# Patient Record
Sex: FEMALE | Race: WHITE | Employment: FULL TIME | URBAN - METROPOLITAN AREA
[De-identification: names, ages, dates, MRNs, and addresses within clinical notes are randomized per-mention and may not be internally consistent; named-entity substitution may affect disease eponyms.]

---

## 2017-10-18 LAB — PAP-ABSTRACT: NORMAL

## 2017-11-30 ENCOUNTER — TRANSFERRED RECORDS (OUTPATIENT)
Dept: HEALTH INFORMATION MANAGEMENT | Facility: CLINIC | Age: 62
End: 2017-11-30

## 2019-03-04 ENCOUNTER — OFFICE VISIT (OUTPATIENT)
Dept: FAMILY MEDICINE | Facility: CLINIC | Age: 64
End: 2019-03-04
Payer: COMMERCIAL

## 2019-03-04 ENCOUNTER — TELEPHONE (OUTPATIENT)
Dept: FAMILY MEDICINE | Facility: CLINIC | Age: 64
End: 2019-03-04

## 2019-03-04 VITALS
HEIGHT: 65 IN | DIASTOLIC BLOOD PRESSURE: 91 MMHG | OXYGEN SATURATION: 97 % | HEART RATE: 73 BPM | SYSTOLIC BLOOD PRESSURE: 160 MMHG | WEIGHT: 187.9 LBS | TEMPERATURE: 97.2 F | BODY MASS INDEX: 31.31 KG/M2

## 2019-03-04 DIAGNOSIS — H61.21 IMPACTED CERUMEN OF RIGHT EAR: ICD-10-CM

## 2019-03-04 DIAGNOSIS — I10 BENIGN ESSENTIAL HYPERTENSION: ICD-10-CM

## 2019-03-04 DIAGNOSIS — Z00.00 ROUTINE HISTORY AND PHYSICAL EXAMINATION OF ADULT: Primary | ICD-10-CM

## 2019-03-04 DIAGNOSIS — E78.5 HYPERLIPIDEMIA LDL GOAL <100: ICD-10-CM

## 2019-03-04 PROCEDURE — 99386 PREV VISIT NEW AGE 40-64: CPT | Mod: 25 | Performed by: INTERNAL MEDICINE

## 2019-03-04 PROCEDURE — 69209 REMOVE IMPACTED EAR WAX UNI: CPT | Performed by: INTERNAL MEDICINE

## 2019-03-04 RX ORDER — SIMVASTATIN 5 MG
5 TABLET ORAL AT BEDTIME
COMMUNITY
End: 2019-03-04

## 2019-03-04 RX ORDER — SIMVASTATIN 5 MG
5 TABLET ORAL AT BEDTIME
Qty: 90 TABLET | Refills: 3 | Status: SHIPPED | OUTPATIENT
Start: 2019-03-04 | End: 2020-04-29

## 2019-03-04 RX ORDER — LOSARTAN POTASSIUM 50 MG/1
50 TABLET ORAL DAILY
Qty: 90 TABLET | Refills: 3 | Status: SHIPPED | OUTPATIENT
Start: 2019-03-04 | End: 2020-04-29

## 2019-03-04 RX ORDER — LOSARTAN POTASSIUM 50 MG/1
50 TABLET ORAL DAILY
COMMUNITY
End: 2019-03-04

## 2019-03-04 SDOH — HEALTH STABILITY: MENTAL HEALTH: HOW OFTEN DO YOU HAVE A DRINK CONTAINING ALCOHOL?: 2-4 TIMES A MONTH

## 2019-03-04 ASSESSMENT — MIFFLIN-ST. JEOR: SCORE: 1408.19

## 2019-03-04 NOTE — NURSING NOTE
Patient identified using two patient identifiers.  Ear exam showing wax occlusion completed by provider.  Solution: warm water was placed in the right ear(s) via irrigation tool: elephant ear.  Brenna Reaves CMA on 3/4/2019 at 3:09 PM

## 2019-03-04 NOTE — PROGRESS NOTES
SUBJECTIVE:   CC: Lety Reyes is an 64 year old woman who presents for preventive health visit.     Healthy Habits:    Do you get at least three servings of calcium containing foods daily (dairy, green leafy vegetables, etc.)? no, taking calcium and/or vitamin D supplement:     Amount of exercise or daily activities, outside of work: 3 day(s) per week    Problems taking medications regularly No    Medication side effects: No    Have you had an eye exam in the past two years? yes    Do you see a dentist twice per year? yes    Do you have sleep apnea, excessive snoring or daytime drowsiness?no        Today's PHQ-2 Score:   PHQ-2 ( 1999 [employer]) 3/4/2019   Q1: Little interest or pleasure in doing things 0   Q2: Feeling down, depressed or hopeless 0   PHQ-2 Score 0       Abuse: Current or Past(Physical, Sexual or Emotional)- No  Do you feel safe in your environment? Yes    Social History     Tobacco Use     Smoking status: Former Smoker     Smokeless tobacco: Never Used   Substance Use Topics     Alcohol use: Yes     Alcohol/week: 2.4 oz     Types: 4 Glasses of wine per week     Frequency: 2-4 times a month     If you drink alcohol do you typically have >3 drinks per day or >7 drinks per week? No                     Reviewed orders with patient.  Reviewed health maintenance and updated orders accordingly - Yes  Labs reviewed in EPIC        Pertinent mammograms are reviewed under the imaging tab.       Reviewed and updated as needed this visit by clinical staff  Tobacco  Allergies  Meds  Soc Hx        Reviewed and updated as needed this visit by Provider        Past Medical History:   Diagnosis Date     HTN (hypertension)      Hyperlipidemia         ROS:  CONSTITUTIONAL: NEGATIVE for fever, chills, change in weight  INTEGUMENTARY/SKIN: NEGATIVE for worrisome rashes, moles or lesions  EYES: NEGATIVE for vision changes or irritation  ENT: NEGATIVE for ear, mouth and throat problems  RESP: NEGATIVE for significant  "cough or SOB  BREAST: NEGATIVE for masses, tenderness or discharge  CV: NEGATIVE for chest pain, palpitations or peripheral edema  GI: NEGATIVE for nausea, abdominal pain, heartburn, or change in bowel habits  : NEGATIVE for unusual urinary or vaginal symptoms. No vaginal bleeding.  MUSCULOSKELETAL: NEGATIVE for significant arthralgias or myalgia  NEURO: NEGATIVE for weakness, dizziness or paresthesias  PSYCHIATRIC: NEGATIVE for changes in mood or affect     OBJECTIVE:   BP (!) 160/91 (BP Location: Left arm, Cuff Size: Adult Regular)   Pulse 73   Temp 97.2  F (36.2  C) (Oral)   Ht 1.651 m (5' 5\")   Wt 85.2 kg (187 lb 14.4 oz)   SpO2 97%   BMI 31.27 kg/m    EXAM:  GENERAL APPEARANCE: alert and no distress  EYES: Eyes grossly normal to inspection, PERRL and conjunctivae and sclerae normal  HENT: nose and mouth without ulcers or lesions, oropharynx clear and oral mucous membranes moist, cerumen impaction symptoms present in the right ear  NECK: no adenopathy, no asymmetry, masses, or scars and thyroid normal to palpation  RESP: lungs clear to auscultation - no rales, rhonchi or wheezes  BREAST: normal without masses, tenderness or nipple discharge and no palpable axillary masses or adenopathy  CV: regular rate and rhythm, normal S1 S2, no S3 or S4, no murmur, click or rub, no peripheral edema and peripheral pulses strong  ABDOMEN: soft, nontender, no hepatosplenomegaly, no masses and bowel sounds normal  MS: no musculoskeletal defects are noted and gait is age appropriate without ataxia  SKIN: no suspicious lesions or rashes  NEURO: Normal strength and tone, sensory exam grossly normal, mentation intact and speech normal  PSYCH: mentation appears normal and affect normal/bright    Diagnostic Test Results:  Results for orders placed or performed during the hospital encounter of 04/16/14   MA Diagnostic Digital Right    Narrative    DIAGNOSTIC MAMMOGRAM RIGHT, DIGITAL w/CAD, ULTRASOUND RIGHT BREAST,  4/16/2014 " "2:03 PM.    HISTORY: Abnormal mammogram, unspecified    BREAST DENSITY: Heterogeneously dense        FINDINGS: Diagnostic mammogram shows a persistent focal density.  Ultrasound shows a corresponding simple cyst at 12:00 3 cm from the  nipple measuring 1 cm. Exam is otherwise stable.      Impression    IMPRESSION: BI-RADS CATEGORY: 2 - Benign Finding(s).    RECOMMENDED FOLLOW-UP: Annual Mammography  .    MIREYA GIBSON MD       ASSESSMENT/PLAN:   (Z00.00) Routine history and physical examination of adult  (primary encounter diagnosis)  (E78.5) Hyperlipidemia LDL goal <100  Comment: stable on Zocor, patient is due for Zocor medication refill  Plan: simvastatin (ZOCOR) 5 MG tablet      (I10) Benign essential hypertension  Comment: BP is not well controlled after the patient ran out of her previous Cozaar medication  Plan: losartan (COZAAR) 50 MG tablet      (H61.21) Impacted cerumen of right ear  Comment: right ear impacted cerumen symtpoms  Plan: HC REMOVAL IMPACTED CERUMEN IRRIGATION/LVG         UNILAT        COUNSELING:   Reviewed preventive health counseling, as reflected in patient instructions  Special attention given to:        Regular exercise       Healthy diet/nutrition    BP Readings from Last 1 Encounters:   03/04/19 (!) 160/91     Estimated body mass index is 31.27 kg/m  as calculated from the following:    Height as of this encounter: 1.651 m (5' 5\").    Weight as of this encounter: 85.2 kg (187 lb 14.4 oz).      Weight management plan: Discussed healthy diet and exercise guidelines     reports that she has quit smoking. she has never used smokeless tobacco.      Counseling Resources:  ATP IV Guidelines  Pooled Cohorts Equation Calculator  Breast Cancer Risk Calculator  FRAX Risk Assessment  ICSI Preventive Guidelines  Dietary Guidelines for Americans, 2010  USDA's MyPlate  ASA Prophylaxis  Lung CA Screening    Peace Finnegan MD  [facility]  "

## 2019-03-04 NOTE — TELEPHONE ENCOUNTER
Please abstract the following data from this visit with this patient into the appropriate field in Epic:    Colonoscopy done on this date: 11/30/17 (approximately), by this group: [facility], results were Repeat colonoscopy in 10 years for screening. If you have signs or symptoms of lower GI illness or a new diagnosis of colon cancer in an immediate family member, you should contact [facility] or your  primary provider to discuss whether your next exam should be repeated sooner.  .   Mammogram done on this date: 10/25/17 (approximately), by this group: [facility] , results were FINDINGS:  Mammographically, the breast tissue has scattered   fibroglandular densities.  No suspicious masses or microcalcifications.    Benign appearing mass(es) within right breast.      Pap smear done on this date: 10/18/17 (approximately), by this group: [facility] , results were NEGATIVE FOR INTRAEPITHELIAL LESION OR MALIGNANCY (NIL).

## 2019-09-29 ENCOUNTER — HEALTH MAINTENANCE LETTER (OUTPATIENT)
Age: 64
End: 2019-09-29

## 2020-01-04 ENCOUNTER — TELEPHONE (OUTPATIENT)
Dept: SCHEDULING | Facility: CLINIC | Age: 65
End: 2020-01-04

## 2020-01-04 NOTE — TELEPHONE ENCOUNTER
1/4/2020    Call Regarding Preventive Health Screening Mammogram       Attempt 1    Message on voicemail    Comments:       Outreach   STEVEN

## 2020-01-10 NOTE — TELEPHONE ENCOUNTER
1/10/2020    Call Regarding Preventive Health Screening Mammogram       Attempt 2    Message on voicemail    Comments:       Outreach   STEVEN

## 2020-01-23 NOTE — TELEPHONE ENCOUNTER
1/22/2020    Call Regarding Preventive Health Screening Mammogram       Attempt 3    Message on voicemail    Comments:       Outreach   STEVEN

## 2020-03-15 ENCOUNTER — HEALTH MAINTENANCE LETTER (OUTPATIENT)
Age: 65
End: 2020-03-15

## 2020-04-27 DIAGNOSIS — E78.5 HYPERLIPIDEMIA LDL GOAL <100: ICD-10-CM

## 2020-04-27 DIAGNOSIS — I10 BENIGN ESSENTIAL HYPERTENSION: ICD-10-CM

## 2020-04-29 RX ORDER — SIMVASTATIN 5 MG
TABLET ORAL
Qty: 90 TABLET | Refills: 0 | Status: SHIPPED | OUTPATIENT
Start: 2020-04-29 | End: 2020-05-01

## 2020-04-29 RX ORDER — LOSARTAN POTASSIUM 50 MG/1
TABLET ORAL
Qty: 90 TABLET | Refills: 0 | Status: SHIPPED | OUTPATIENT
Start: 2020-04-29 | End: 2020-05-01

## 2020-04-29 NOTE — TELEPHONE ENCOUNTER
, Please schedule patient for virtual visit.  Route back if patient needs medication to get through to his/her appointment.  Tori Rizo RN

## 2020-04-29 NOTE — TELEPHONE ENCOUNTER
"Requested Prescriptions   Pending Prescriptions Disp Refills     simvastatin (ZOCOR) 5 MG tablet [Pharmacy Med Name: SIMVASTATIN 5MG TABLETS]  Last Written Prescription Date:  3/4/2019  Last Fill Quantity: 90 tablet,  # refills: 3   Last office visit: 3/4/2019 with prescribing provider:  Kain   Future Office Visit:     90 tablet 3     Sig: TAKE 1 TABLET(5 MG) BY MOUTH AT BEDTIME       Statins Protocol Failed - 4/27/2020 11:15 AM        Failed - LDL on file in past 12 months     No lab results found.          Failed - Recent (12 mo) or future (30 days) visit within the authorizing provider's specialty     Patient has had an office visit with the authorizing provider or a provider within the authorizing providers department within the previous 12 mos or has a future within next 30 days. See \"Patient Info\" tab in inbasket, or \"Choose Columns\" in Meds & Orders section of the refill encounter.              Passed - No abnormal creatine kinase in past 12 months     No lab results found.             Passed - Medication is active on med list        Passed - Patient is age 18 or older        Passed - No active pregnancy on record        Passed - No positive pregnancy test in past 12 months           losartan (COZAAR) 50 MG tablet [Pharmacy Med Name: LOSARTAN 50MG TABLETS]  Last Written Prescription Date:  3/4/2019  Last Fill Quantity: 90 tablet,  # refills: 3   Last office visit: 3/4/2019 with prescribing provider:  Kain   Future Office Visit:     90 tablet 3     Sig: TAKE 1 TABLET(50 MG) BY MOUTH DAILY       Angiotensin-II Receptors Failed - 4/27/2020 11:15 AM        Failed - Last blood pressure under 140/90 in past 12 months     BP Readings from Last 3 Encounters:   03/04/19 (!) 160/91                 Failed - Recent (12 mo) or future (30 days) visit within the authorizing provider's specialty     Patient has had an office visit with the authorizing provider or a provider within the authorizing providers department within " "the previous 12 mos or has a future within next 30 days. See \"Patient Info\" tab in inbasket, or \"Choose Columns\" in Meds & Orders section of the refill encounter.              Failed - Normal serum creatinine on file in past 12 months     No lab results found.    Ok to refill medication if creatinine is low          Failed - Normal serum potassium on file in past 12 months     No lab results found.                 Passed - Medication is active on med list        Passed - Patient is age 18 or older        Passed - No active pregnancy on record        Passed - No positive pregnancy test in past 12 months              "

## 2020-05-01 ENCOUNTER — VIRTUAL VISIT (OUTPATIENT)
Dept: FAMILY MEDICINE | Facility: CLINIC | Age: 65
End: 2020-05-01
Payer: COMMERCIAL

## 2020-05-01 DIAGNOSIS — Z76.0 ENCOUNTER FOR MEDICATION REFILL: ICD-10-CM

## 2020-05-01 DIAGNOSIS — E78.5 HYPERLIPIDEMIA LDL GOAL <100: Primary | ICD-10-CM

## 2020-05-01 DIAGNOSIS — I10 BENIGN ESSENTIAL HYPERTENSION: ICD-10-CM

## 2020-05-01 PROCEDURE — 99214 OFFICE O/P EST MOD 30 MIN: CPT | Mod: 95 | Performed by: INTERNAL MEDICINE

## 2020-05-01 RX ORDER — SIMVASTATIN 5 MG
TABLET ORAL
Qty: 90 TABLET | Refills: 3 | Status: SHIPPED | OUTPATIENT
Start: 2020-05-01

## 2020-05-01 RX ORDER — LOSARTAN POTASSIUM 50 MG/1
50 TABLET ORAL DAILY
Qty: 90 TABLET | Refills: 3 | Status: SHIPPED | OUTPATIENT
Start: 2020-05-01

## 2020-05-01 NOTE — PROGRESS NOTES
"Lety Reyes is a 65 year old female who is being evaluated via a billable telephone visit.      The patient has been notified of following:     \"This telephone visit will be conducted via a call between you and your physician/provider. We have found that certain health care needs can be provided without the need for a physical exam.  This service lets us provide the care you need with a short phone conversation.  If a prescription is necessary we can send it directly to your pharmacy.  If lab work is needed we can place an order for that and you can then stop by our lab to have the test done at a later time.    Telephone visits are billed at different rates depending on your insurance coverage. During this emergency period, for some insurers they may be billed the same as an in-person visit.  Please reach out to your insurance provider with any questions.    If during the course of the call the physician/provider feels a telephone visit is not appropriate, you will not be charged for this service.\"    Patient has given verbal consent for Telephone visit?  Yes    How would you like to obtain your AVS? Darleen        Chief Complaint:       Lety Reyes is a 65 year old female today for telephone evaluation of the following health issues:    Follow up on medications for hypertension, hyperlipidemia    HPI:   Patient Lety Reyes is a very pleasant 65 year old female with history of hypertension, hyperlipidemia today for telephone follow up on medications for hypertension, hyperlipidemia. Regarding the patient's hypertension, the patient's BP is well controlled according to patient's report at this time, patient is due for a refill of the Losartan BP medication. Regarding the patient's hyperlipidemia, the patient is compliant with her Simvastatin medication and is due for a refill of the Simvastatin cholesterol medication at this time. No chest pain, headaches, fever or chills.           Current Medications: "     Current Outpatient Medications   Medication Sig Dispense Refill     losartan (COZAAR) 50 MG tablet Take 1 tablet (50 mg) by mouth daily 90 tablet 3     simvastatin (ZOCOR) 5 MG tablet TAKE 1 TABLET(5 MG) BY MOUTH AT BEDTIME 90 tablet 3         Allergies:      Allergies   Allergen Reactions     Penicillins Other (See Comments) and Hives     Lisinopril Cough            Past Medical History:     Past Medical History:   Diagnosis Date     HTN (hypertension)      Hyperlipidemia          Past Surgical History:   No past surgical history on file.      Family Medical History:     Family History   Problem Relation Age of Onset     Cerebrovascular Disease Mother      Hypertension Father      Coronary Artery Disease Father      Lung Cancer Father          Social History:     Social History     Socioeconomic History     Marital status:      Spouse name: Not on file     Number of children: Not on file     Years of education: Not on file     Highest education level: Not on file   Occupational History     Not on file   Social Needs     Financial resource strain: Not on file     Food insecurity     Worry: Not on file     Inability: Not on file     Transportation needs     Medical: Not on file     Non-medical: Not on file   Tobacco Use     Smoking status: Former Smoker     Smokeless tobacco: Never Used   Substance and Sexual Activity     Alcohol use: Yes     Alcohol/week: 4.0 standard drinks     Types: 4 Glasses of wine per week     Frequency: 2-4 times a month     Drug use: No     Sexual activity: Yes     Partners: Male   Lifestyle     Physical activity     Days per week: Not on file     Minutes per session: Not on file     Stress: Not on file   Relationships     Social connections     Talks on phone: Not on file     Gets together: Not on file     Attends Bahai service: Not on file     Active member of club or organization: Not on file     Attends meetings of clubs or organizations: Not on file     Relationship  status: Not on file     Intimate partner violence     Fear of current or ex partner: Not on file     Emotionally abused: Not on file     Physically abused: Not on file     Forced sexual activity: Not on file   Other Topics Concern     Not on file   Social History Narrative     Not on file           Review of System:     Constitutional: Negative for fever or chills  Skin: Negative for rashes  Ears/Nose/Throat: Negative for nasal congestion, sore throat  Respiratory: No shortness of breath, dyspnea on exertion, cough, or hemoptysis  Cardiovascular: Negative for chest pain  Gastrointestinal: Negative for nausea, vomiting  Genitourinary: Negative for dysuria, hematuria  Musculoskeletal: Negative for myalgias  Neurologic: Negative for headaches  Psychiatric: Negative for depression, anxiety  Hematologic/Lymphatic/Immunologic: Negative  Endocrine: Negative  Behavioral: Negative for tobacco use       Physical Exam:   There were no vitals taken for this visit.        Diagnostic Test Results:     Diagnostic Test Results:  No results found for any visits on 05/01/20.       ASSESSMENT/PLAN:     (Z76.0) Encounter for medication refill  (E78.5) Hyperlipidemia LDL goal <100  (primary encounter diagnosis)  Comment: stable. Patient is due for a refill of Simvastatin cholesterol medication  Plan: simvastatin (ZOCOR) 5 MG tablet      (I10) Benign essential hypertension  Comment: BP is well controlled according to patient's report at this time, patient is due for a refill of the Losartan BP medication.  Plan: losartan (COZAAR) 50 MG tablet      Follow Up Plan:     Patient is instructed to return to Internal Medicine clinic for follow-up visit in 6 months.        Phone call duration:  25 minutes    Peace Finnegan MD

## 2020-10-26 ENCOUNTER — TELEPHONE (OUTPATIENT)
Dept: FAMILY MEDICINE | Facility: CLINIC | Age: 65
End: 2020-10-26

## 2020-10-26 NOTE — TELEPHONE ENCOUNTER
Panel Management Review      Patient has the following on her problem list: None      Composite cancer screening  Chart review shows that this patient is due/due soon for the following Pap Smear and Mammogram  Summary:    Patient is due/failing the following:   Hep C screening, LDL LIPIDS , MAMMOGRAM and PAP    Action needed:   Patient needs referral/order: mammo & GYN     Type of outreach:    Sent letter.    Questions for provider review:    None                                                                                                                                    Lanette MCCOY MA      Chart routed to no one.  Will await pt to schedule.

## 2020-10-26 NOTE — LETTER
[facility]  [address]  [address]  [place]  [address]  Tel: [phone]    October 26, 2020    Lety Reyes  [address] [facility] [address]        Satya Davidson,     In review of Health Maintenance our records show you are due for a mammogram  & pap smear or we need to track down the reports. The last we have on file for both are from 10/2017.    If you have had a mammo or pap smear elsewhere could you make sure we get a copy for our files please?  Our fax # here at [facility] is [phone].       You are always welcome to call the [facility]  [phone] located here in our building [address] to schedule the mammo.    If you are due for a pap smear and do not have a GYN, Dr. Finnegan refers patients around the corner from us to  [facility]   [address]   [facility] [address]     (ph) [phone]           You can just give them a call and they can get you scheduled!       Sincerely,     Lanette MCCOY MA  on behalf of Dr. Finnegan

## 2021-01-14 ENCOUNTER — HEALTH MAINTENANCE LETTER (OUTPATIENT)
Age: 66
End: 2021-01-14

## 2021-03-14 ENCOUNTER — HEALTH MAINTENANCE LETTER (OUTPATIENT)
Age: 66
End: 2021-03-14

## 2021-10-24 ENCOUNTER — HEALTH MAINTENANCE LETTER (OUTPATIENT)
Age: 66
End: 2021-10-24

## 2022-04-10 ENCOUNTER — HEALTH MAINTENANCE LETTER (OUTPATIENT)
Age: 67
End: 2022-04-10

## 2022-10-15 ENCOUNTER — HEALTH MAINTENANCE LETTER (OUTPATIENT)
Age: 67
End: 2022-10-15